# Patient Record
Sex: FEMALE | Race: WHITE | NOT HISPANIC OR LATINO | Employment: UNEMPLOYED | URBAN - NONMETROPOLITAN AREA
[De-identification: names, ages, dates, MRNs, and addresses within clinical notes are randomized per-mention and may not be internally consistent; named-entity substitution may affect disease eponyms.]

---

## 2017-01-11 ENCOUNTER — OFFICE VISIT (OUTPATIENT)
Dept: RETAIL CLINIC | Facility: CLINIC | Age: 52
End: 2017-01-11

## 2017-01-11 VITALS — HEART RATE: 85 BPM | OXYGEN SATURATION: 98 % | TEMPERATURE: 99 F | RESPIRATION RATE: 18 BRPM | WEIGHT: 185 LBS

## 2017-01-11 DIAGNOSIS — J01.90 ACUTE NON-RECURRENT SINUSITIS, UNSPECIFIED LOCATION: Primary | ICD-10-CM

## 2017-01-11 PROCEDURE — 99213 OFFICE O/P EST LOW 20 MIN: CPT | Performed by: NURSE PRACTITIONER

## 2017-01-11 RX ORDER — DEXTROMETHORPHAN HYDROBROMIDE AND PROMETHAZINE HYDROCHLORIDE 15; 6.25 MG/5ML; MG/5ML
5 SYRUP ORAL 4 TIMES DAILY PRN
Qty: 118 ML | Refills: 0 | Status: SHIPPED | OUTPATIENT
Start: 2017-01-11 | End: 2017-01-16

## 2017-01-11 RX ORDER — ALBUTEROL SULFATE 90 UG/1
2 AEROSOL, METERED RESPIRATORY (INHALATION) EVERY 6 HOURS PRN
Qty: 1 INHALER | Refills: 0 | Status: SHIPPED | OUTPATIENT
Start: 2017-01-11 | End: 2017-01-18

## 2017-01-11 RX ORDER — CEFDINIR 300 MG/1
300 CAPSULE ORAL 2 TIMES DAILY
Qty: 20 CAPSULE | Refills: 0 | Status: SHIPPED | OUTPATIENT
Start: 2017-01-11 | End: 2017-01-21

## 2017-01-11 RX ORDER — LEVOTHYROXINE SODIUM 0.07 MG/1
75 TABLET ORAL DAILY
COMMUNITY

## 2017-01-11 NOTE — PATIENT INSTRUCTIONS

## 2017-01-11 NOTE — MR AVS SNAPSHOT
Starr Puri   1/11/2017 10:00 AM   Office Visit    Dept Phone:  [phone]   Encounter #:  [id number]    Provider:  PROVIDER BEC BEREA   Department:  [facility]                Your Full Care Plan              Today's Medication Changes          These changes are accurate as of: 1/11/17 10:17 AM.  If you have any questions, ask your nurse or doctor.               New Medication(s)Ordered:     albuterol 108 (90 BASE) MCG/ACT inhaler   Commonly known as:  PROVENTIL HFA;VENTOLIN HFA   Inhale 2 puffs Every 6 (Six) Hours As Needed for wheezing or shortness of air for up to 7 days.       cefdinir 300 MG capsule   Commonly known as:  OMNICEF   Take 1 capsule by mouth 2 (Two) Times a Day for 10 days.       promethazine-dextromethorphan 6.25-15 MG/5ML syrup   Commonly known as:  PROMETHAZINE-DM   Take 5 mL by mouth 4 (Four) Times a Day As Needed for cough for up to 5 days.            Where to Get Your Medications      [facility] [place] [id number]  - [phone] [address] [address]     Phone:  [phone]     albuterol 108 (90 BASE) MCG/ACT inhaler    cefdinir 300 MG capsule    promethazine-dextromethorphan 6.25-15 MG/5ML syrup                  Your Updated Medication List          This list is accurate as of: 1/11/17 10:17 AM.  Always use your most recent med list.                albuterol 108 (90 BASE) MCG/ACT inhaler   Commonly known as:  PROVENTIL HFA;VENTOLIN HFA   Inhale 2 puffs Every 6 (Six) Hours As Needed for wheezing or shortness of air for up to 7 days.       cefdinir 300 MG capsule   Commonly known as:  OMNICEF   Take 1 capsule by mouth 2 (Two) Times a Day for 10 days.       CRESTOR PO       levothyroxine 75 MCG tablet   Commonly known as:  SYNTHROID, LEVOTHROID       promethazine-dextromethorphan 6.25-15 MG/5ML syrup   Commonly known as:  PROMETHAZINE-DM   Take 5 mL by mouth 4  (Four) Times a Day As Needed for cough for up to 5 days.               You Were Diagnosed With        Codes Comments    Acute non-recurrent sinusitis, unspecified location    -  Primary ICD-10-CM: J01.90  ICD-9-CM: 461.9       Instructions    Sinusitis, Adult  Sinusitis is redness, soreness, and inflammation of the paranasal sinuses. Paranasal sinuses are air pockets within the bones of your face. They are located beneath your eyes, in the middle of your forehead, and above your eyes. In healthy paranasal sinuses, mucus is able to drain out, and air is able to circulate through them by way of your nose. However, when your paranasal sinuses are inflamed, mucus and air can become trapped. This can allow bacteria and other germs to grow and cause infection.  Sinusitis can develop quickly and last only a short time (acute) or continue over a long period (chronic). Sinusitis that lasts for more than 12 weeks is considered chronic.  CAUSES  Causes of sinusitis include:  · Allergies.  · Structural abnormalities, such as displacement of the cartilage that separates your nostrils (deviated septum), which can decrease the air flow through your nose and sinuses and affect sinus drainage.  · Functional abnormalities, such as when the small hairs (cilia) that line your sinuses and help remove mucus do not work properly or are not present.  SIGNS AND SYMPTOMS  Symptoms of acute and chronic sinusitis are the same. The primary symptoms are pain and pressure around the affected sinuses. Other symptoms include:  · Upper toothache.  · Earache.  · Headache.  · Bad breath.  · Decreased sense of smell and taste.  · A cough, which worsens when you are lying flat.  · Fatigue.  · Fever.  · Thick drainage from your nose, which often is green and may contain pus (purulent).  · Swelling and warmth over the affected sinuses.  DIAGNOSIS  Your health care provider will perform a physical exam. During your exam, your health care provider may  perform any of the following to help determine if you have acute sinusitis or chronic sinusitis:  · Look in your nose for signs of abnormal growths in your nostrils (nasal polyps).  · Tap over the affected sinus to check for signs of infection.  · View the inside of your sinuses using an imaging device that has a light attached (endoscope).  If your health care provider suspects that you have chronic sinusitis, one or more of the following tests may be recommended:  · Allergy tests.  · Nasal culture. A sample of mucus is taken from your nose, sent to a lab, and screened for bacteria.  · Nasal cytology. A sample of mucus is taken from your nose and examined by your health care provider to determine if your sinusitis is related to an allergy.  TREATMENT  Most cases of acute sinusitis are related to a viral infection and will resolve on their own within 10 days. Sometimes, medicines are prescribed to help relieve symptoms of both acute and chronic sinusitis. These may include pain medicines, decongestants, nasal steroid sprays, or saline sprays.  However, for sinusitis related to a bacterial infection, your health care provider will prescribe antibiotic medicines. These are medicines that will help kill the bacteria causing the infection.  Rarely, sinusitis is caused by a fungal infection. In these cases, your health care provider will prescribe antifungal medicine.  For some cases of chronic sinusitis, surgery is needed. Generally, these are cases in which sinusitis recurs more than 3 times per year, despite other treatments.  HOME CARE INSTRUCTIONS  · Drink plenty of water. Water helps thin the mucus so your sinuses can drain more easily.  · Use a humidifier.  · Inhale steam 3-4 times a day (for example, sit in the bathroom with the shower running).  · Apply a warm, moist washcloth to your face 3-4 times a day, or as directed by your health care provider.  · Use saline nasal sprays to help moisten and clean your  sinuses.  · Take medicines only as directed by your health care provider.  · If you were prescribed either an antibiotic or antifungal medicine, finish it all even if you start to feel better.  SEEK IMMEDIATE MEDICAL CARE IF:  · You have increasing pain or severe headaches.  · You have nausea, vomiting, or drowsiness.  · You have swelling around your face.  · You have vision problems.  · You have a stiff neck.  · You have difficulty breathing.     This information is not intended to replace advice given to you by your health care provider. Make sure you discuss any questions you have with your health care provider.     Document Released: 2006 Document Revised: 2016 Document Reviewed: 2013  [employer] Interactive Patient Education © [employer]       Patient Instructions History      Upcoming Appointments     Visit Type Date Time Department    OFFICE VISIT 2017 10:00 AM [facility]      Rapt Signup     [employer] allows you to send messages to your doctor, view your test results, renew your prescriptions, schedule appointments, and more. To sign up, go to WhatsApp and click on the Sign Up Now link in the New User? box. Enter your Rapt Activation Code exactly as it appears below along with the last four digits of your Social Security Number and your Date of Birth () to complete the sign-up process. If you do not sign up before the expiration date, you must request a new code.    Rapt Activation Code: [email or web address]  Expires: 2017 10:17 AM    If you have questions, you can email [email or web address] or call [phone] to talk to our Rapt staff. Remember, [employer] is NOT to be used for urgent needs. For medical emergencies, dial 911.               Other Info from Your Visit           Allergies     No Known Allergies      Reason for Visit     URI           Vital Signs     Pulse Temperature Respirations Weight Last Menstrual Period  Oxygen Saturation    85 99 °F (37.2 °C) (Oral) 18 185 lb (83.9 kg) 12/01/2016 (Approximate) 98%    Breastfeeding? Smoking Status                No Former Smoker          Problems and Diagnoses Noted     Acute non-recurrent sinusitis, unspecified location    -  Primary

## 2017-01-11 NOTE — PROGRESS NOTES
URI      Subjective   Starr Puri is a 51 y.o. female.     URI    This is a new problem. Episode onset: 6 days ago. The problem has been gradually worsening. Maximum temperature: tactile with chills  Associated symptoms include congestion, coughing, headaches, rhinorrhea and a sore throat. Pertinent negatives include no abdominal pain, chest pain, diarrhea, ear pain, nausea, rash, sneezing, vomiting or wheezing. She has tried acetaminophen (Tylenol Sinus; Cough drops) for the symptoms.    Has had influenza vaccine.  See ROS.     There is no problem list on file for this patient.      No Known Allergies     No current outpatient prescriptions on file prior to visit.     No current facility-administered medications on file prior to visit.        Past Medical History   Diagnosis Date   • Disease of thyroid gland    • Elevated cholesterol    • Heart attack        Past Surgical History   Procedure Laterality Date   • Coronary angioplasty with stent placement     • Shoulder percutaneous pinning Right        Family History   Problem Relation Age of Onset   • Diabetes Mother    • Obesity Mother    • Alzheimer's disease Mother    • Lung disease Father    • Cancer Father    • Obesity Father    • Diabetes Sister    • Heart disease Sister    • Cancer Brother    • Heart disease Brother    • Diabetes Brother        Social History     Social History   • Marital status:      Spouse name: N/A   • Number of children: N/A   • Years of education: N/A     Occupational History   • Not on file.     Social History Main Topics   • Smoking status: Former Smoker     Packs/day: 0.50     Years: 20.00     Types: Cigarettes   • Smokeless tobacco: Never Used   • Alcohol use No   • Drug use: No   • Sexual activity: Defer     Other Topics Concern   • Not on file     Social History Narrative   • No narrative on file       Travel:  No recent travel within the last 21 days outside the [place] Denies recent travel to one of the following [place]   Countries:  [place], [place], [place], or [place].  Denies contact with anyone who has traveled to one of the following [place]  Countries: [place], [place], [place], or [place] within the last 21 days and is known or suspected to have Ebola.  Denies having had any contact with the human remains, blood or any bodily fluids of someone who is known or suspected to have Ebola within the last 21 days.     OB History     No data available          Review of Systems   Constitutional: Positive for chills and fever (tactile). Negative for diaphoresis.   HENT: Positive for congestion, postnasal drip, rhinorrhea, sore throat and voice change (hoarseness; voice loss over the last 3 days). Negative for ear discharge, ear pain, mouth sores, nosebleeds, sinus pressure and sneezing.    Respiratory: Positive for cough and shortness of breath. Negative for wheezing.    Cardiovascular: Negative for chest pain.   Gastrointestinal: Negative for abdominal pain, diarrhea, nausea and vomiting.   Musculoskeletal: Positive for myalgias (at baseline).   Skin: Negative for rash.   Neurological: Positive for light-headedness and headaches.       Visit Vitals   • Pulse 85   • Temp 99 °F (37.2 °C) (Oral)   • Resp 18   • Wt 185 lb (83.9 kg)   • LMP 12/01/2016 (Approximate)   • SpO2 98%   • Breastfeeding No       Objective   Physical Exam   Constitutional: She is oriented to person, place, and time. She appears well-developed and well-nourished. She is cooperative. She appears ill. No distress.   HENT:   Head: Normocephalic and atraumatic.   Right Ear: External ear and ear canal normal.   Left Ear: Tympanic membrane, external ear and ear canal normal.   Nose: Right sinus exhibits maxillary sinus tenderness and frontal sinus tenderness. Left sinus exhibits maxillary sinus tenderness and frontal sinus tenderness.   Mouth/Throat: Uvula is midline, oropharynx is clear and moist and mucous membranes are normal. No tonsillar exudate.    Right occluded by cerumen    Cardiovascular: Normal rate, regular rhythm and normal heart sounds.    Pulmonary/Chest: Effort normal and breath sounds normal.   Lymphadenopathy:        Head (right side): Tonsillar adenopathy present.        Head (left side): Tonsillar adenopathy present.     She has no cervical adenopathy.   Neurological: She is alert and oriented to person, place, and time.   Skin: Skin is warm, dry and intact. No rash noted.       Assessment/Plan   Starr was seen today for uri.    Diagnoses and all orders for this visit:    Acute non-recurrent sinusitis, unspecified location    Other orders  -     cefdinir (OMNICEF) 300 MG capsule; Take 1 capsule by mouth 2 (Two) Times a Day for 10 days.  -     albuterol (PROVENTIL HFA;VENTOLIN HFA) 108 (90 BASE) MCG/ACT inhaler; Inhale 2 puffs Every 6 (Six) Hours As Needed for wheezing or shortness of air for up to 7 days.  -     promethazine-dextromethorphan (PROMETHAZINE-DM) 6.25-15 MG/5ML syrup; Take 5 mL by mouth 4 (Four) Times a Day As Needed for cough for up to 5 days.        No results found for this or any previous visit.    Return if symptoms worsen or fail to improve.

## 2019-09-07 ENCOUNTER — OFFICE VISIT (OUTPATIENT)
Dept: RETAIL CLINIC | Facility: CLINIC | Age: 54
End: 2019-09-07

## 2019-09-07 VITALS
HEART RATE: 92 BPM | OXYGEN SATURATION: 97 % | TEMPERATURE: 98.6 F | WEIGHT: 200 LBS | DIASTOLIC BLOOD PRESSURE: 78 MMHG | SYSTOLIC BLOOD PRESSURE: 128 MMHG | RESPIRATION RATE: 16 BRPM

## 2019-09-07 DIAGNOSIS — J30.9 ALLERGIC RHINITIS, UNSPECIFIED SEASONALITY, UNSPECIFIED TRIGGER: Primary | ICD-10-CM

## 2019-09-07 LAB
EXPIRATION DATE: NORMAL
INTERNAL CONTROL: NORMAL
Lab: NORMAL
S PYO AG THROAT QL: NEGATIVE

## 2019-09-07 PROCEDURE — 99213 OFFICE O/P EST LOW 20 MIN: CPT | Performed by: NURSE PRACTITIONER

## 2019-09-07 PROCEDURE — 87880 STREP A ASSAY W/OPTIC: CPT | Performed by: NURSE PRACTITIONER

## 2019-09-07 RX ORDER — PREDNISONE 20 MG/1
20 TABLET ORAL 3 TIMES DAILY
Qty: 9 TABLET | Refills: 0 | Status: SHIPPED | OUTPATIENT
Start: 2019-09-07 | End: 2019-09-10

## 2019-09-07 RX ORDER — ATENOLOL 50 MG/1
TABLET ORAL
Refills: 3 | COMMUNITY
Start: 2019-06-24

## 2019-09-07 RX ORDER — LORATADINE 10 MG/1
10 TABLET ORAL DAILY
Qty: 30 TABLET | Refills: 0 | Status: SHIPPED | OUTPATIENT
Start: 2019-09-07 | End: 2019-10-07

## 2019-09-07 RX ORDER — FLUTICASONE PROPIONATE 50 MCG
2 SPRAY, SUSPENSION (ML) NASAL DAILY PRN
Qty: 1 BOTTLE | Refills: 0 | Status: SHIPPED | OUTPATIENT
Start: 2019-09-07 | End: 2019-10-07

## 2019-09-07 NOTE — PROGRESS NOTES
Subjective   Starr Puri is a 53 y.o. female.     URI    This is a new problem. Episode onset: 2 days ago. The problem has been gradually worsening. There has been no fever. Associated symptoms include congestion, coughing, headaches, neck pain, a plugged ear sensation, rhinorrhea, sneezing, a sore throat, swollen glands and wheezing. Pertinent negatives include no abdominal pain, chest pain, diarrhea, ear pain, nausea, rash, sinus pain or vomiting. Associated symptoms comments: Chills, bodyaches. Treatments tried: OTC sinus meds. The treatment provided no relief.        Current Outpatient Medications on File Prior to Visit   Medication Sig Dispense Refill   • atenolol (TENORMIN) 50 MG tablet TAKE 1 2 (ONE HALF) TABLET BY MOUTH ONCE DAILY  3   • levothyroxine (SYNTHROID, LEVOTHROID) 75 MCG tablet Take 75 mcg by mouth Daily.     • Rosuvastatin Calcium (CRESTOR PO) Take  by mouth.       No current facility-administered medications on file prior to visit.        No Known Allergies    Past Medical History:   Diagnosis Date   • Disease of thyroid gland    • Elevated cholesterol    • Heart attack (CMS/HCC)        Past Surgical History:   Procedure Laterality Date   • CORONARY ANGIOPLASTY WITH STENT PLACEMENT     • SHOULDER PERCUTANEOUS PINNING Right        Family History   Problem Relation Age of Onset   • Diabetes Mother    • Obesity Mother    • Alzheimer's disease Mother    • Lung disease Father    • Cancer Father    • Obesity Father    • Diabetes Sister    • Heart disease Sister    • Cancer Brother    • Heart disease Brother    • Diabetes Brother        Social History     Socioeconomic History   • Marital status:      Spouse name: Not on file   • Number of children: Not on file   • Years of education: Not on file   • Highest education level: Not on file   Tobacco Use   • Smoking status: Former Smoker     Packs/day: 0.50     Years: 20.00     Pack years: 10.00     Types: Cigarettes   • Smokeless tobacco: Never  Used   Substance and Sexual Activity   • Alcohol use: No   • Drug use: No   • Sexual activity: Defer       Review of Systems   Constitutional: Positive for appetite change and chills. Negative for activity change, diaphoresis and fever.   HENT: Positive for congestion, rhinorrhea, sneezing and sore throat. Negative for ear pain and sinus pain.    Respiratory: Positive for cough and wheezing.    Cardiovascular: Negative for chest pain.   Gastrointestinal: Negative for abdominal pain, diarrhea, nausea and vomiting.   Musculoskeletal: Positive for neck pain.   Skin: Negative for rash.   Neurological: Positive for headaches.       /78   Pulse 92   Temp 98.6 °F (37 °C)   Resp 16   Wt 90.7 kg (200 lb)   SpO2 97%     Objective   Physical Exam   Constitutional: She is oriented to person, place, and time. She appears well-developed and well-nourished. She is cooperative.   HENT:   Head: Normocephalic.   Right Ear: External ear normal.   Left Ear: External ear normal.   Nose: Rhinorrhea present. Right sinus exhibits no maxillary sinus tenderness and no frontal sinus tenderness. Left sinus exhibits no maxillary sinus tenderness and no frontal sinus tenderness.   Mouth/Throat: Uvula is midline, oropharynx is clear and moist and mucous membranes are normal. No tonsillar exudate.   Bilateral TM obscured by cerumen   Eyes: Conjunctivae, EOM and lids are normal.   Cardiovascular: Normal rate, regular rhythm and normal heart sounds.   Pulmonary/Chest: Effort normal and breath sounds normal. No accessory muscle usage. No respiratory distress.   Occasional cough during visit   Lymphadenopathy:     She has no cervical adenopathy.   Neurological: She is alert and oriented to person, place, and time.   Skin: Skin is warm, dry and intact.   Psychiatric: She has a normal mood and affect. Her speech is normal and behavior is normal.         Assessment/Plan   Starr was seen today for uri.    Diagnoses and all orders for this  visit:    Allergic rhinitis, unspecified seasonality, unspecified trigger  -     POCT rapid strep A  -     predniSONE (DELTASONE) 20 MG tablet; Take 1 tablet by mouth 3 (Three) Times a Day for 3 days.  -     loratadine (CLARITIN) 10 MG tablet; Take 1 tablet by mouth Daily for 30 days.  -     fluticasone (FLONASE) 50 MCG/ACT nasal spray; 2 sprays into the nostril(s) as directed by provider Daily As Needed for Allergies for up to 30 days.        Results for orders placed or performed in visit on 09/07/19   POCT rapid strep A   Result Value Ref Range    Rapid Strep A Screen Negative Negative, VALID, INVALID, Not Performed    Internal Control Passed Passed    Lot Number [id number]     Expiration Date 3/21        Follow up with PCP or go to the nearest emergency room if symptoms worsen or fail to improve.

## 2019-09-07 NOTE — PATIENT INSTRUCTIONS
Allergic Rhinitis, Adult  Allergic rhinitis is an allergic reaction that affects the mucous membrane inside the nose. It causes sneezing, a runny or stuffy nose, and the feeling of mucus going down the back of the throat (postnasal drip). Allergic rhinitis can be mild to severe.  There are two types of allergic rhinitis:  · Seasonal. This type is also called hay fever. It happens only during certain seasons.  · Perennial. This type can happen at any time of the year.  What are the causes?  This condition happens when the body's defense system (immune system) responds to certain harmless substances called allergens as though they were germs.   Seasonal allergic rhinitis is triggered by pollen, which can come from grasses, trees, and weeds. Perennial allergic rhinitis may be caused by:  · House dust mites.  · Pet dander.  · Mold spores.  What are the signs or symptoms?  Symptoms of this condition include:  · Sneezing.  · Runny or stuffy nose (nasal congestion).  · Postnasal drip.  · Itchy nose.  · Tearing of the eyes.  · Trouble sleeping.  · Daytime sleepiness.  How is this diagnosed?  This condition may be diagnosed based on:  · Your medical history.  · A physical exam.  · Tests to check for related conditions, such as:  ? Asthma.  ? Pink eye.  ? Ear infection.  ? Upper respiratory infection.  · Tests to find out which allergens trigger your symptoms. These may include skin or blood tests.  How is this treated?  There is no cure for this condition, but treatment can help control symptoms. Treatment may include:  · Taking medicines that block allergy symptoms, such as antihistamines. Medicine may be given as a shot, nasal spray, or pill.  · Avoiding the allergen.  · Desensitization. This treatment involves getting ongoing shots until your body becomes less sensitive to the allergen. This treatment may be done if other treatments do not help.  · If taking medicine and avoiding the allergen does not work, new, stronger  medicines may be prescribed.  Follow these instructions at home:  · Find out what you are allergic to. Common allergens include smoke, dust, and pollen.  · Avoid the things you are allergic to. These are some things you can do to help avoid allergens:  ? Replace carpet with wood, tile, or vinyl isabel. Carpet can trap dander and dust.  ? Do not smoke. Do not allow smoking in your home.  ? Change your heating and air conditioning filter at least once a month.  ? During allergy season:  § Keep windows closed as much as possible.  § Plan outdoor activities when pollen counts are lowest. This is usually during the evening hours.  § When coming indoors, change clothing and shower before sitting on furniture or bedding.  · Take over-the-counter and prescription medicines only as told by your health care provider.  · Keep all follow-up visits as told by your health care provider. This is important.  Contact a health care provider if:  · You have a fever.  · You develop a persistent cough.  · You make whistling sounds when you breathe (you wheeze).  · Your symptoms interfere with your normal daily activities.  Get help right away if:  · You have shortness of breath.  Summary  · This condition can be managed by taking medicines as directed and avoiding allergens.  · Contact your health care provider if you develop a persistent cough or fever.  · During allergy season, keep windows closed as much as possible.  This information is not intended to replace advice given to you by your health care provider. Make sure you discuss any questions you have with your health care provider.  Document Released: 09/12/2002 Document Revised: 01/25/2018 Document Reviewed: 01/25/2018  [employer] Interactive Patient Education © 2019 [employer].

## 2021-09-15 ENCOUNTER — LAB (OUTPATIENT)
Dept: LAB | Facility: HOSPITAL | Age: 56
End: 2021-09-15

## 2021-09-15 ENCOUNTER — TRANSCRIBE ORDERS (OUTPATIENT)
Dept: LAB | Facility: HOSPITAL | Age: 56
End: 2021-09-15

## 2021-09-15 DIAGNOSIS — Z20.822 COVID-19 RULED OUT: Primary | ICD-10-CM

## 2021-09-15 DIAGNOSIS — Z20.822 COVID-19 RULED OUT: ICD-10-CM

## 2021-09-15 PROCEDURE — C9803 HOPD COVID-19 SPEC COLLECT: HCPCS

## 2021-09-15 PROCEDURE — U0004 COV-19 TEST NON-CDC HGH THRU: HCPCS

## 2021-09-16 LAB — SARS-COV-2 RNA NOSE QL NAA+PROBE: NOT DETECTED

## 2025-03-17 NOTE — PROGRESS NOTES
"  Subjective     PROBLEM LIST:  Anemia  Hypothyroidism  Hyperlipidemia  Hypertension  B12 deficiency  COPD  CAD, s/p stent 2013    CHIEF COMPLAINT: anemia      HISTORY OF PRESENT ILLNESS:  The patient is a 59 y.o. female, referred for evaluation of anemia.    Labs:    12/10/24: b12 386, hgb 13.7, mcv 85, WBC 14.6    10/3/24: b12 268    8/20/24: hgb 10.4, mcv 115, iron 146, ferritin 281, iron sat 50%, tibc 290, TSH 9.72, MMA 24401 (H), homocysteine 125 (H), retic 2.3%, B12 < 50, folate > 20    8/15/24: wbc 7.5, hgb 10.9, mcv 114, plt 320, ANC 4.4, ALC 2.6    She is currently taking a monthly B12 injection.  She does feel better since starting this.  She is not having any trouble with the shots.    She says that her sister and maybe her mother also had trouble with low B12.      REVIEW OF SYSTEMS:  A 14 point review of systems was performed and is negative except as noted above.    Past Medical History:   Diagnosis Date    Disease of thyroid gland     Elevated cholesterol     Heart attack            Objective     /82   Pulse 80   Temp 97.7 °F (36.5 °C)   Resp 16   Ht 157.5 cm (62\")   Wt 97.3 kg (214 lb 9.6 oz)   SpO2 97%   BMI 39.25 kg/m²   Performance Status:  ECOG score: 0           General: well appearing female in no acute distress  Neuro: alert and oriented  HEENT: sclerae anicteric, oropharynx clear  Skin: no rashes, lesions, bruising, or petechiae  Psych: mood and affect appropriate    No results found for: \"WBC\", \"HGB\", \"HCT\", \"MCV\", \"PLT\"  No results found for: \"GLUCOSE\", \"BUN\", \"CREATININE\", \"EGFRIFNONA\", \"EGFRIFAFRI\", \"BCR\", \"K\", \"CO2\", \"CALCIUM\", \"PROTENTOTREF\", \"ALBUMIN\", \"LABIL2\", \"BILIRUBIN\", \"AST\", \"ALT\"    No image results found.            ASSESSMENT AND PLAN:     Starr Puri is a 59 y.o. female with B12 deficiency, now with normal labs after starting B12 replacement.    She has responded well to B12 injections.  She is tolerating this without difficulty.    We discussed the option of " intrinsic factor antibody testing to determine if her deficiency was caused by pernicious anemia which is an autoimmune condition.  This would not change her current management, however it would indicate that she likely will need lifelong parenteral placement.  She said she is not interested in antibody testing at this time.    In the absence of this data, I would recommend that she continue injections of B12 monthly indefinitely.    As patients with pernicious anemia can have a slightly increased risk of gastric cancer, any upper GI symptoms should be evaluated with endoscopy in the future.    I will not schedule any routine hematology follow-up.  I am happy to see her in the future if needed.           A total greater than 35 mins minutes was spent in face to face patient time, examination, counseling, charting, reviewing test results, and reviewing outside records.    Larisa Moore MD    3/18/2025

## 2025-03-18 ENCOUNTER — CONSULT (OUTPATIENT)
Dept: ONCOLOGY | Facility: CLINIC | Age: 60
End: 2025-03-18
Payer: MEDICARE

## 2025-03-18 VITALS
DIASTOLIC BLOOD PRESSURE: 82 MMHG | HEIGHT: 62 IN | TEMPERATURE: 97.7 F | WEIGHT: 214.6 LBS | HEART RATE: 80 BPM | SYSTOLIC BLOOD PRESSURE: 138 MMHG | BODY MASS INDEX: 39.49 KG/M2 | OXYGEN SATURATION: 97 % | RESPIRATION RATE: 16 BRPM

## 2025-03-18 DIAGNOSIS — E53.8 B12 DEFICIENCY: Primary | ICD-10-CM

## 2025-03-18 PROCEDURE — 99203 OFFICE O/P NEW LOW 30 MIN: CPT | Performed by: INTERNAL MEDICINE

## 2025-03-18 PROCEDURE — 1126F AMNT PAIN NOTED NONE PRSNT: CPT | Performed by: INTERNAL MEDICINE

## 2025-03-18 RX ORDER — CEFDINIR 300 MG/1
300 CAPSULE ORAL
COMMUNITY
Start: 2025-03-17 | End: 2025-03-27

## 2025-03-18 RX ORDER — METHYLPREDNISOLONE 4 MG/1
TABLET ORAL SEE ADMIN INSTRUCTIONS
COMMUNITY
Start: 2025-03-17 | End: 2025-03-24

## 2025-03-18 RX ORDER — CYANOCOBALAMIN 1000 UG/ML
1000 INJECTION, SOLUTION INTRAMUSCULAR; SUBCUTANEOUS
COMMUNITY
Start: 2024-12-02

## 2025-03-18 RX ORDER — LEVOTHYROXINE SODIUM 88 UG/1
88 TABLET ORAL DAILY
COMMUNITY
Start: 2025-02-15

## 2025-03-18 NOTE — LETTER
"March 18, 2025     Bobbi Pierre MD  [address] [facility]  [place] [address]    Patient: Starr Puri   YOB: 1965   Date of Visit: 3/18/2025     Dear Bobbi Pierre MD:       Thank you for referring Starr Puri to me for evaluation. Below are the relevant portions of my assessment and plan of care.    If you have questions, please do not hesitate to call me. I look forward to following Starr along with you.         Sincerely,        Larisa Moore MD        CC: No Recipients    Larisa Moore MD  03/18/25 1118  Sign when Signing Visit    Subjective    PROBLEM LIST:  Anemia  Hypothyroidism  Hyperlipidemia  Hypertension  B12 deficiency  COPD  CAD, s/p stent 2013    CHIEF COMPLAINT: anemia      HISTORY OF PRESENT ILLNESS:  The patient is a 59 y.o. female, referred for evaluation of anemia.    Labs:    12/10/24: b12 386, hgb 13.7, mcv 85, WBC 14.6    10/3/24: b12 268    8/20/24: hgb 10.4, mcv 115, iron 146, ferritin 281, iron sat 50%, tibc 290, TSH 9.72, MMA 92657 (H), homocysteine 125 (H), retic 2.3%, B12 < 50, folate > 20    8/15/24: wbc 7.5, hgb 10.9, mcv 114, plt 320, ANC 4.4, ALC 2.6    She is currently taking a monthly B12 injection.  She does feel better since starting this.  She is not having any trouble with the shots.    She says that her sister and maybe her mother also had trouble with low B12.      REVIEW OF SYSTEMS:  A 14 point review of systems was performed and is negative except as noted above.    Past Medical History:   Diagnosis Date   • Disease of thyroid gland    • Elevated cholesterol    • Heart attack            Objective    /82   Pulse 80   Temp 97.7 °F (36.5 °C)   Resp 16   Ht 157.5 cm (62\")   Wt 97.3 kg (214 lb 9.6 oz)   SpO2 97%   BMI 39.25 kg/m²   Performance Status:  ECOG score: 0           General: well appearing female in no acute distress  Neuro: alert and oriented  HEENT: sclerae anicteric, oropharynx clear  Skin: no rashes, " "lesions, bruising, or petechiae  Psych: mood and affect appropriate    No results found for: \"WBC\", \"HGB\", \"HCT\", \"MCV\", \"PLT\"  No results found for: \"GLUCOSE\", \"BUN\", \"CREATININE\", \"EGFRIFNONA\", \"EGFRIFAFRI\", \"BCR\", \"K\", \"CO2\", \"CALCIUM\", \"PROTENTOTREF\", \"ALBUMIN\", \"LABIL2\", \"BILIRUBIN\", \"AST\", \"ALT\"    No image results found.            ASSESSMENT AND PLAN:     Starr Puri is a 59 y.o. female with B12 deficiency, now with normal labs after starting B12 replacement.    She has responded well to B12 injections.  She is tolerating this without difficulty.    We discussed the option of intrinsic factor antibody testing to determine if her deficiency was caused by pernicious anemia which is an autoimmune condition.  This would not change her current management, however it would indicate that she likely will need lifelong parenteral placement.  She said she is not interested in antibody testing at this time.    In the absence of this data, I would recommend that she continue injections of B12 monthly indefinitely.    As patients with pernicious anemia can have a slightly increased risk of gastric cancer, any upper GI symptoms should be evaluated with endoscopy in the future.    I will not schedule any routine hematology follow-up.  I am happy to see her in the future if needed.           A total greater than 35 mins minutes was spent in face to face patient time, examination, counseling, charting, reviewing test results, and reviewing outside records.    Larisa Moore MD    3/18/2025        "

## 2025-04-11 ENCOUNTER — TELEPHONE (OUTPATIENT)
Dept: GASTROENTEROLOGY | Facility: CLINIC | Age: 60
End: 2025-04-11
Payer: COMMERCIAL

## 2025-04-11 DIAGNOSIS — Z12.11 ENCOUNTER FOR SCREENING FOR MALIGNANT NEOPLASM OF COLON: Primary | ICD-10-CM

## 2025-04-11 RX ORDER — SODIUM, POTASSIUM,MAG SULFATES 17.5-3.13G
2 SOLUTION, RECONSTITUTED, ORAL ORAL ONCE
Qty: 354 ML | Refills: 0 | Status: SHIPPED | OUTPATIENT
Start: 2025-04-11 | End: 2025-04-11

## 2025-04-11 NOTE — TELEPHONE ENCOUNTER
OPEN ACCESS COLONOSCOPY QUESTIONNAIRE        Are you currently experiencing any of the following symptoms?    Abdominal pain, bloating or cramping? NO  Change in bowel habits? NO  Diarrhea? NO  Constipation? YES  Mucus in stool? NO        Do you have a family history of any of the following?    Colon cancer? NO Colon polyps? NO  Inflammatory bowel disease? YES SON        Have you ever had a colonoscopy before? NO If yes, approximate date/year?     Was the result normal?         Have you recently had a medical change or new diagnosis? NO If so, what was the change? NO    Have you recently started any new medications? (Ie: blood thinners, GLP-1's) NO    Do you have any issues with transportation? NO

## 2025-04-11 NOTE — TELEPHONE ENCOUNTER
Patient request for open access colon - Positive Cologuard - message to medical assistant for review

## 2025-04-17 ENCOUNTER — TELEPHONE (OUTPATIENT)
Dept: GASTROENTEROLOGY | Facility: CLINIC | Age: 60
End: 2025-04-17
Payer: COMMERCIAL

## 2025-04-17 NOTE — TELEPHONE ENCOUNTER
The patient called in today and said that she wanted to cancel her Colonoscopy. I reintegrated that she needed it because she had a positive Cologuard. She still would like to cancel.